# Patient Record
Sex: FEMALE | Race: WHITE | HISPANIC OR LATINO | URBAN - METROPOLITAN AREA
[De-identification: names, ages, dates, MRNs, and addresses within clinical notes are randomized per-mention and may not be internally consistent; named-entity substitution may affect disease eponyms.]

---
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
## 2018-06-05 NOTE — ED PROVIDER NOTE - PRINCIPAL DIAGNOSIS
Detail Level: Zone Patient Specific Counseling (Will Not Stick From Patient To Patient): Do foot soaks with water/hydrogen peroxide daily for 15 minutes then apply clobetasol cream. Detail Level: Detailed Patient Specific Counseling (Will Not Stick From Patient To Patient): Apply clobetasol cream daily Patient Specific Counseling (Will Not Stick From Patient To Patient): Possibly from nifidepine.  Patient instructed to follow-up with PCP regarding lower extremity edema. Abdominal pain

## 2019-01-29 ENCOUNTER — EMERGENCY (EMERGENCY)
Facility: HOSPITAL | Age: 19
LOS: 1 days | Discharge: HOME | End: 2019-01-29
Attending: EMERGENCY MEDICINE | Admitting: EMERGENCY MEDICINE
Payer: COMMERCIAL

## 2019-01-29 VITALS
SYSTOLIC BLOOD PRESSURE: 99 MMHG | HEART RATE: 78 BPM | RESPIRATION RATE: 17 BRPM | DIASTOLIC BLOOD PRESSURE: 64 MMHG | TEMPERATURE: 98 F | HEIGHT: 61 IN | WEIGHT: 115.08 LBS | OXYGEN SATURATION: 99 %

## 2019-01-29 VITALS — OXYGEN SATURATION: 99 % | RESPIRATION RATE: 16 BRPM | TEMPERATURE: 98 F | HEART RATE: 74 BPM

## 2019-01-29 PROBLEM — Z00.00 ENCOUNTER FOR PREVENTIVE HEALTH EXAMINATION: Status: ACTIVE | Noted: 2019-01-29

## 2019-01-29 PROCEDURE — 99283 EMERGENCY DEPT VISIT LOW MDM: CPT

## 2019-01-29 PROCEDURE — 99282 EMERGENCY DEPT VISIT SF MDM: CPT

## 2019-01-29 RX ORDER — AZTREONAM 2 G
1 VIAL (EA) INJECTION
Qty: 14 | Refills: 0 | OUTPATIENT
Start: 2019-01-29 | End: 2019-02-04

## 2019-01-29 NOTE — ED PROVIDER NOTE - CARE PROVIDER_API CALL
Reggie Perez  Phone: [phone]  Fax: (   )    -    Remi Gonzalez (MD), Dermatology  [address]  [address]  [place] [address]  Phone: [phone]  Fax: [phone]    Carlos Grissom), Dermatology  [address]  [address]  [place] [address]  Phone: [phone]  Fax: [phone]

## 2019-01-29 NOTE — ED PROVIDER NOTE - NSFOLLOWUPINSTRUCTIONS_ED_ALL_ED_FT
1) Follow-up with your Primary Medical Doctor, Call today for prompt follow-up.  2) Return to Emergency room for any worsening or persistent pain, weakness, fever, or any other concerning symptoms.  3) See attached instruction sheets for additional information, including information regarding signs and symptoms to look out for, reasons to seek immediate care and other important instructions.  4) Follow-up with Dermatology, call today to arrange prompt follow-up  5) Bactrim twice daily for 7 days

## 2019-01-29 NOTE — ED PROVIDER NOTE - CHPI ED SYMPTOMS NEG
no purulent drainage/no red streaks/no chills/no redness/no vomiting/no bleeding/no bleeding at site

## 2019-01-29 NOTE — ED ADULT NURSE NOTE - OBJECTIVE STATEMENT
pt with wound to right side of chest under the breast. pt states she had this for "years" but in the last few days has been starting to hurt.

## 2019-01-29 NOTE — ED PROVIDER NOTE - PHYSICAL EXAMINATION
R upper abd / Lower chest with 2 x 1 cm irregular shaped. R upper abd / Lower chest with 2 x 1 cm irregular shaped lesion with mild surr erythema / warmth. Min tender. no crepitus. No evid of deeper infxn.

## 2019-01-29 NOTE — ED PROVIDER NOTE - PROGRESS NOTE DETAILS
Dw pt and mother at length (in english and Vietnamese with ARABELLA Nguyen translating. dw them re nature of lesion, need for prompt derm fu for definitive diag (due to risk of cancer) and for definitive diag and rx.

## 2019-01-29 NOTE — ED PROVIDER NOTE - PROVIDER TOKENS
FREE:[LAST:[Chris],FIRST:[Reggie],PHONE:[phone]],FAX:[phone]   [phone]    -]],TOKEN:'[id number]',TOKEN:'[id number]'

## 2019-01-29 NOTE — ED PROVIDER NOTE - OBJECTIVE STATEMENT
19 yo F p/w has a chronic wound on her R upper abd / lower chest. Pt states developed pain and mild swelling , ? after picking at it, over past ~ 2 days. No fever/chills. no discharge. NO recent changes to wound. no weak / dizzy / malaise. no trauma. Pt states has had this wound for years. NO agg/allev factors. No other inj or co.

## 2019-01-29 NOTE — ED PROVIDER NOTE - CARE PROVIDERS DIRECT ADDRESSES
,DirectAddress_Unknown,[email or web address],DirectAddress_Unknown

## 2019-01-30 ENCOUNTER — APPOINTMENT (OUTPATIENT)
Dept: DERMATOLOGY | Facility: CLINIC | Age: 19
End: 2019-01-30
Payer: COMMERCIAL

## 2019-01-30 VITALS
HEIGHT: 61 IN | WEIGHT: 115 LBS | BODY MASS INDEX: 21.71 KG/M2 | SYSTOLIC BLOOD PRESSURE: 100 MMHG | DIASTOLIC BLOOD PRESSURE: 70 MMHG

## 2019-01-30 DIAGNOSIS — L72.9 FOLLICULAR CYST OF THE SKIN AND SUBCUTANEOUS TISSUE, UNSPECIFIED: ICD-10-CM

## 2019-01-30 DIAGNOSIS — Z91.89 OTHER SPECIFIED PERSONAL RISK FACTORS, NOT ELSEWHERE CLASSIFIED: ICD-10-CM

## 2019-01-30 DIAGNOSIS — L70.0 ACNE VULGARIS: ICD-10-CM

## 2019-01-30 PROCEDURE — 99202 OFFICE O/P NEW SF 15 MIN: CPT

## 2019-01-30 RX ORDER — CLINDAMYCIN PHOSPHATE 10 MG/ML
1 SOLUTION TOPICAL
Qty: 1 | Refills: 2 | Status: ACTIVE | COMMUNITY
Start: 2019-01-30 | End: 1900-01-01

## 2019-01-30 NOTE — HISTORY OF PRESENT ILLNESS
[FreeTextEntry1] : np spot on abdomen, acne [de-identified] : Patient is a 18 year old F presenting with\par \par 1. spot on abdomen x 5 years. became enlarged and tender over the past 5 days. never been treated.\par 2. acne on face and back x years. asymptomatic. never been treated.

## 2019-01-30 NOTE — PHYSICAL EXAM
[Alert] : alert [Oriented x 3] : ~L oriented x 3 [Well Nourished] : well nourished [Conjunctiva Non-injected] : conjunctiva non-injected [No Visual Lymphadenopathy] : no visual  lymphadenopathy [No Clubbing] : no clubbing [No Edema] : no edema [No Bromhidrosis] : no bromhidrosis [No Chromhidrosis] : no chromhidrosis [FreeTextEntry3] : tender hyperpigmented subcutaneous nodule on R upper abdomen\par \par scattered inflammatory papules and many hyperpigmented macules on upper back\par \par inflammatory papules and comedones on forehead

## 2019-04-30 ENCOUNTER — APPOINTMENT (OUTPATIENT)
Dept: DERMATOLOGY | Facility: CLINIC | Age: 19
End: 2019-04-30

## 2023-07-29 NOTE — ED PROVIDER NOTE - SECONDARY DIAGNOSIS.
Pt family called staff into room at 1315 stating they felt the patient had passed. MD called and came to room to confirm death. Family having questions about cremation process. Left a message for Shasta RUSHING. Family coming in from [place] around 2/3pm   Depressive disorder Ovarian cyst